# Patient Record
Sex: FEMALE | Race: WHITE | NOT HISPANIC OR LATINO
[De-identification: names, ages, dates, MRNs, and addresses within clinical notes are randomized per-mention and may not be internally consistent; named-entity substitution may affect disease eponyms.]

---

## 2019-10-16 ENCOUNTER — TRANSCRIPTION ENCOUNTER (OUTPATIENT)
Age: 39
End: 2019-10-16

## 2020-02-01 ENCOUNTER — OUTPATIENT (OUTPATIENT)
Dept: OUTPATIENT SERVICES | Facility: HOSPITAL | Age: 40
LOS: 1 days | Discharge: HOME | End: 2020-02-01
Payer: COMMERCIAL

## 2020-02-01 DIAGNOSIS — N63.20 UNSPECIFIED LUMP IN THE LEFT BREAST, UNSPECIFIED QUADRANT: ICD-10-CM

## 2020-02-01 PROCEDURE — G0279: CPT | Mod: 26

## 2020-02-01 PROCEDURE — 77066 DX MAMMO INCL CAD BI: CPT | Mod: 26

## 2020-02-01 PROCEDURE — 76642 ULTRASOUND BREAST LIMITED: CPT | Mod: 26,LT

## 2020-03-23 PROBLEM — Z00.00 ENCOUNTER FOR PREVENTIVE HEALTH EXAMINATION: Status: ACTIVE | Noted: 2020-03-23

## 2020-08-26 ENCOUNTER — APPOINTMENT (OUTPATIENT)
Dept: BREAST CENTER | Facility: CLINIC | Age: 40
End: 2020-08-26
Payer: COMMERCIAL

## 2020-08-26 VITALS
HEIGHT: 64 IN | SYSTOLIC BLOOD PRESSURE: 132 MMHG | DIASTOLIC BLOOD PRESSURE: 80 MMHG | BODY MASS INDEX: 29.88 KG/M2 | WEIGHT: 175 LBS | TEMPERATURE: 98 F

## 2020-08-26 DIAGNOSIS — N63.20 UNSPECIFIED LUMP IN THE LEFT BREAST, UNSPECIFIED QUADRANT: ICD-10-CM

## 2020-08-26 DIAGNOSIS — Z86.79 PERSONAL HISTORY OF OTHER DISEASES OF THE CIRCULATORY SYSTEM: ICD-10-CM

## 2020-08-26 DIAGNOSIS — Z86.39 PERSONAL HISTORY OF OTHER ENDOCRINE, NUTRITIONAL AND METABOLIC DISEASE: ICD-10-CM

## 2020-08-26 DIAGNOSIS — R92.2 INCONCLUSIVE MAMMOGRAM: ICD-10-CM

## 2020-08-26 DIAGNOSIS — Z87.2 PERSONAL HISTORY OF DISEASES OF THE SKIN AND SUBCUTANEOUS TISSUE: ICD-10-CM

## 2020-08-26 PROCEDURE — 99203 OFFICE O/P NEW LOW 30 MIN: CPT

## 2020-08-26 NOTE — REVIEW OF SYSTEMS
[Fever] : no fever [Abn Vaginal Bleeding] : no unexplained vaginal bleeding [Chills] : no chills [As Noted in HPI] : as noted in HPI [Skin Wound] : skin wound [Breast Pain] : no breast pain [Hot Flashes] : no hot flashes [Breast Lump] : no breast lump [Negative] : Heme/Lymph

## 2020-08-26 NOTE — PAST MEDICAL HISTORY
[Menstruating] : The patient is menstruating [Menarche Age ____] : age at menarche was [unfilled] [Definite ___ (Date)] : the last menstrual period was [unfilled] [History of Hormone Replacement Treatment] : has no history of hormone replacement treatment [Total Preg ___] : G[unfilled] [Normal Amount/Duration] : it was of a normal amount and duration [Regular Cycle Intervals] : have been regular [Live Births ___] : P[unfilled]  [FreeTextEntry5] : denies  [Age At Live Birth ___] : Age at live birth: [unfilled] [FreeTextEntry7] : denies [FreeTextEntry6] : denies [FreeTextEntry8] : yes currently x 2 years

## 2020-08-26 NOTE — DATA REVIEWED
[FreeTextEntry1] : EXAM: MG MAMMO DIAG W MARÍA ELENA BI#\par EXAM: US BREAST LIMITED LT\par \par \par PROCEDURE DATE: 02/01/2020\par \par \par \par INTERPRETATION: Clinical History / Reason for exam: Area of palpable\par concern in the lateral left breast. The patient is currently breast-feeding.\par \par The patient reports her last clinical breast examination was performed 1\par month ago.\par \par Family history: None\par \par Comparisons: None.\par \par Views obtained:Full field CC and MLO views of bilateral breasts with\par tomosynthesis. Spot compression views of the left breast..\par \par Computer-aided detection was utilized in the interpretation of this\par examination.\par \par Breast composition:The breasts are heterogeneously dense, which may obscure\par small masses.\par \par Findings:\par \par Mammogram:\par \par Triangular marker denotes the site of palpable concern in the left breast\par upper outer quadrant. No suspicious mass, microcalcifications or areas of\par architectural distortion is seen either breast.\par \par Ultrasound:\par \par Targeted unilateral left breast ultrasound was performed.\par \par No suspicious solid or cystic masses.\par \par Impression: No mammographic or sonographic evidence of malignancy. No\par mammographic or sonographic correlate to the area palpable concern in the\par lateral left breast.\par \par Recommendation: Any decision to biopsy the palpable abnormality should be\par based on the level of clinical concern.\par \par BI-RADS Category 1: Negative\par \par \par The above findings and recommendations were discussed with the patient at\par the time of the examination.\par \par \par \par \par \par \par GEOFF SWANSON M.D., ATTENDING RADIOLOGIST\par This document has been electronically signed. Feb 1 2020 3:00PM\par

## 2020-08-26 NOTE — ASSESSMENT
[FreeTextEntry1] : Gwendolyn is a 40 F with dense breast tissue, hidradenitis, and a gyn palpated left breast mass. \par \par On exam, I was not able to palpate any suspicious abnormalities within either breast.  Her most recent imaging was a b/l dx mammogram and L breast US On 2/1/2020 which was unrvealing for any suspicious abnormalities, deemed BIRADS 1. \par \par I will have her scheduled for a repeat b/l screening mammogram and US on 2/1/2021.  I will have her follow up after for a CBE. \par \par We discussed dense breasts.  Increasing breast density has been found to increase ones risk of breast cancer, but at this time, there is no clear indication for additional imaging in this setting, as both US and MRI have not been found to improve survival.  One can consider bilateral screening US.  However, out of 1000 women screened, the use of routine US will only identify an additional 3-5 cancers.  The use of US was found to increase the likelihood of undergoing more imaging and more biopsies.  She does have dense breasts.  We have decided to proceed with screening bilateral breast US at this time.  This will be scheduled with her next screening mammogram.\par \par She is otherwise at an average risk for breast cancer and should start annual screening mammograms at the age of 41. \par \par In regards to her hidradenitis, I have referred her to a plastic surgeon for possible surgical treatment. \par \par All of her questions were answered.  She knows to call with any further questions or concerns. \par \par PLAN: \par -b/l screening mammogram and US 2/1/2021\par -f/up after

## 2020-08-26 NOTE — PHYSICAL EXAM
[Normocephalic] : normocephalic [Atraumatic] : atraumatic [EOMI] : extra ocular movement intact [No Supraclavicular Adenopathy] : no supraclavicular adenopathy [Examined in the supine and seated position] : examined in the supine and seated position [No Cervical Adenopathy] : no cervical adenopathy [No dominant masses] : no dominant masses left breast [No dominant masses] : no dominant masses in right breast  [No Nipple Retraction] : no right nipple retraction [No Axillary Lymphadenopathy] : no left axillary lymphadenopathy [Soft] : abdomen soft [Not Tender] : non-tender [No Edema] : no edema [No Rashes] : no rashes [No Ulceration] : no ulceration [de-identified] : no suspicious masses palpated within either breast  [de-identified] : in area of concern @3N8, no suspicious abnormalities were palpated  [de-identified] : several open wounds from her hsitory of hidradenitis, no surrounding erythema or induration currently

## 2020-09-28 ENCOUNTER — TRANSCRIPTION ENCOUNTER (OUTPATIENT)
Age: 40
End: 2020-09-28

## 2021-04-29 ENCOUNTER — OUTPATIENT (OUTPATIENT)
Dept: OUTPATIENT SERVICES | Facility: HOSPITAL | Age: 41
LOS: 1 days | Discharge: HOME | End: 2021-04-29

## 2021-04-29 DIAGNOSIS — N97.9 FEMALE INFERTILITY, UNSPECIFIED: ICD-10-CM

## 2021-05-03 ENCOUNTER — EMERGENCY (EMERGENCY)
Facility: HOSPITAL | Age: 41
LOS: 0 days | Discharge: HOME | End: 2021-05-03
Attending: EMERGENCY MEDICINE | Admitting: EMERGENCY MEDICINE
Payer: COMMERCIAL

## 2021-05-03 VITALS
TEMPERATURE: 97 F | DIASTOLIC BLOOD PRESSURE: 82 MMHG | WEIGHT: 186.07 LBS | HEIGHT: 64 IN | OXYGEN SATURATION: 97 % | HEART RATE: 80 BPM | SYSTOLIC BLOOD PRESSURE: 134 MMHG | RESPIRATION RATE: 20 BRPM
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
## 2021-05-03 DIAGNOSIS — M54.2 CERVICALGIA: ICD-10-CM
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
## 2021-05-03 PROCEDURE — 99053 MED SERV 10PM-8AM 24 HR FAC: CPT

## 2021-05-03 PROCEDURE — 99283 EMERGENCY DEPT VISIT LOW MDM: CPT

## 2021-05-03 NOTE — ED PROVIDER NOTE - PHYSICAL EXAMINATION
Physical Exam    Vital Signs: I have reviewed the initial vital signs.  Constitutional: well-nourished, appears stated age, no acute distress  Eyes: Conjunctiva pink, Sclera clear, PERRLA, EOMI.  Cardiovascular: S1 and S2, regular rate, regular rhythm, well-perfused extremities, radial pulses equal and 2+  Respiratory: unlabored respiratory effort, clear to auscultation bilaterally no wheezing, rales and rhonchi  Gastrointestinal: soft, non-tender abdomen, no pulsatile mass, normal bowl sounds  Musculoskeletal: supple neck, no lower extremity edema, no midline tenderness. TTP of the L cervical paraspinal region, no bruising, swelling or crepitus noted.   Integumentary: warm, dry, no rash  Neurologic: awake, alert, cranial nerves II-XII grossly intact, extremities’ motor and sensory functions grossly intact  Psychiatric: appropriate mood, appropriate affect

## 2021-05-03 NOTE — ED PROVIDER NOTE - PATIENT PORTAL LINK FT
You can access the FollowMyHealth Patient Portal offered by [facility] by registering at the following website: [email or web address]. By joining [employer]’s FollowMyHealth portal, you will also be able to view your health information using other applications (apps) compatible with our system.
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
## 2021-06-25 ENCOUNTER — EMERGENCY (EMERGENCY)
Facility: HOSPITAL | Age: 41
LOS: 0 days | Discharge: HOME | End: 2021-06-25
Attending: STUDENT IN AN ORGANIZED HEALTH CARE EDUCATION/TRAINING PROGRAM | Admitting: STUDENT IN AN ORGANIZED HEALTH CARE EDUCATION/TRAINING PROGRAM
Payer: COMMERCIAL

## 2021-06-25 VITALS
DIASTOLIC BLOOD PRESSURE: 98 MMHG | HEIGHT: 64 IN | SYSTOLIC BLOOD PRESSURE: 152 MMHG | OXYGEN SATURATION: 99 % | RESPIRATION RATE: 18 BRPM | TEMPERATURE: 98 F | WEIGHT: 160.06 LBS | HEART RATE: 89 BPM

## 2021-06-25 DIAGNOSIS — Z3A.01 LESS THAN 8 WEEKS GESTATION OF PREGNANCY: ICD-10-CM

## 2021-06-25 DIAGNOSIS — N93.9 ABNORMAL UTERINE AND VAGINAL BLEEDING, UNSPECIFIED: ICD-10-CM

## 2021-06-25 DIAGNOSIS — O09.521 SUPERVISION OF ELDERLY MULTIGRAVIDA, FIRST TRIMESTER: ICD-10-CM

## 2021-06-25 DIAGNOSIS — O99.611 DISEASES OF THE DIGESTIVE SYSTEM COMPLICATING PREGNANCY, FIRST TRIMESTER: ICD-10-CM

## 2021-06-25 DIAGNOSIS — O20.9 HEMORRHAGE IN EARLY PREGNANCY, UNSPECIFIED: ICD-10-CM

## 2021-06-25 DIAGNOSIS — K21.9 GASTRO-ESOPHAGEAL REFLUX DISEASE WITHOUT ESOPHAGITIS: ICD-10-CM

## 2021-06-25 PROBLEM — Z78.9 OTHER SPECIFIED HEALTH STATUS: Chronic | Status: ACTIVE | Noted: 2021-05-03

## 2021-06-25 LAB
ABO RH CONFIRMATION: SIGNIFICANT CHANGE UP
ALBUMIN SERPL ELPH-MCNC: 4.4 G/DL — SIGNIFICANT CHANGE UP (ref 3.5–5.2)
ALP SERPL-CCNC: 74 U/L — SIGNIFICANT CHANGE UP (ref 30–115)
ALT FLD-CCNC: 13 U/L — SIGNIFICANT CHANGE UP (ref 0–41)
ANION GAP SERPL CALC-SCNC: 8 MMOL/L — SIGNIFICANT CHANGE UP (ref 7–14)
APPEARANCE UR: ABNORMAL
APTT BLD: 32.5 SEC — SIGNIFICANT CHANGE UP (ref 27–39.2)
AST SERPL-CCNC: 16 U/L — SIGNIFICANT CHANGE UP (ref 0–41)
BACTERIA # UR AUTO: NEGATIVE — SIGNIFICANT CHANGE UP
BASOPHILS # BLD AUTO: 0.02 K/UL — SIGNIFICANT CHANGE UP (ref 0–0.2)
BASOPHILS NFR BLD AUTO: 0.2 % — SIGNIFICANT CHANGE UP (ref 0–1)
BILIRUB SERPL-MCNC: <0.2 MG/DL — SIGNIFICANT CHANGE UP (ref 0.2–1.2)
BILIRUB UR-MCNC: NEGATIVE — SIGNIFICANT CHANGE UP
BLD GP AB SCN SERPL QL: SIGNIFICANT CHANGE UP
BUN SERPL-MCNC: 9 MG/DL — LOW (ref 10–20)
CALCIUM SERPL-MCNC: 8.9 MG/DL — SIGNIFICANT CHANGE UP (ref 8.5–10.1)
CHLORIDE SERPL-SCNC: 105 MMOL/L — SIGNIFICANT CHANGE UP (ref 98–110)
CO2 SERPL-SCNC: 24 MMOL/L — SIGNIFICANT CHANGE UP (ref 17–32)
COLOR SPEC: ABNORMAL
CREAT SERPL-MCNC: 0.8 MG/DL — SIGNIFICANT CHANGE UP (ref 0.7–1.5)
DIFF PNL FLD: ABNORMAL
EOSINOPHIL # BLD AUTO: 0.03 K/UL — SIGNIFICANT CHANGE UP (ref 0–0.7)
EOSINOPHIL NFR BLD AUTO: 0.3 % — SIGNIFICANT CHANGE UP (ref 0–8)
EPI CELLS # UR: 4 /HPF — SIGNIFICANT CHANGE UP (ref 0–5)
GLUCOSE SERPL-MCNC: 92 MG/DL — SIGNIFICANT CHANGE UP (ref 70–99)
GLUCOSE UR QL: NEGATIVE — SIGNIFICANT CHANGE UP
HCG SERPL-ACNC: 13.6 MIU/ML — HIGH
HCT VFR BLD CALC: 38.3 % — SIGNIFICANT CHANGE UP (ref 37–47)
HGB BLD-MCNC: 12.8 G/DL — SIGNIFICANT CHANGE UP (ref 12–16)
HYALINE CASTS # UR AUTO: 4 /LPF — SIGNIFICANT CHANGE UP (ref 0–7)
IMM GRANULOCYTES NFR BLD AUTO: 0.3 % — SIGNIFICANT CHANGE UP (ref 0.1–0.3)
INR BLD: 1.06 RATIO — SIGNIFICANT CHANGE UP (ref 0.65–1.3)
KETONES UR-MCNC: NEGATIVE — SIGNIFICANT CHANGE UP
LEUKOCYTE ESTERASE UR-ACNC: ABNORMAL
LYMPHOCYTES # BLD AUTO: 1.23 K/UL — SIGNIFICANT CHANGE UP (ref 1.2–3.4)
LYMPHOCYTES # BLD AUTO: 12.6 % — LOW (ref 20.5–51.1)
MCHC RBC-ENTMCNC: 29.6 PG — SIGNIFICANT CHANGE UP (ref 27–31)
MCHC RBC-ENTMCNC: 33.4 G/DL — SIGNIFICANT CHANGE UP (ref 32–37)
MCV RBC AUTO: 88.5 FL — SIGNIFICANT CHANGE UP (ref 81–99)
MONOCYTES # BLD AUTO: 0.43 K/UL — SIGNIFICANT CHANGE UP (ref 0.1–0.6)
MONOCYTES NFR BLD AUTO: 4.4 % — SIGNIFICANT CHANGE UP (ref 1.7–9.3)
NEUTROPHILS # BLD AUTO: 8 K/UL — HIGH (ref 1.4–6.5)
NEUTROPHILS NFR BLD AUTO: 82.2 % — HIGH (ref 42.2–75.2)
NITRITE UR-MCNC: NEGATIVE — SIGNIFICANT CHANGE UP
NRBC # BLD: 0 /100 WBCS — SIGNIFICANT CHANGE UP (ref 0–0)
PH UR: 6.5 — SIGNIFICANT CHANGE UP (ref 5–8)
PLATELET # BLD AUTO: 255 K/UL — SIGNIFICANT CHANGE UP (ref 130–400)
POTASSIUM SERPL-MCNC: 4.3 MMOL/L — SIGNIFICANT CHANGE UP (ref 3.5–5)
POTASSIUM SERPL-SCNC: 4.3 MMOL/L — SIGNIFICANT CHANGE UP (ref 3.5–5)
PROT SERPL-MCNC: 6.9 G/DL — SIGNIFICANT CHANGE UP (ref 6–8)
PROT UR-MCNC: ABNORMAL
PROTHROM AB SERPL-ACNC: 12.2 SEC — SIGNIFICANT CHANGE UP (ref 9.95–12.87)
RBC # BLD: 4.33 M/UL — SIGNIFICANT CHANGE UP (ref 4.2–5.4)
RBC # FLD: 12.9 % — SIGNIFICANT CHANGE UP (ref 11.5–14.5)
RBC CASTS # UR COMP ASSIST: 194 /HPF — HIGH (ref 0–4)
SODIUM SERPL-SCNC: 137 MMOL/L — SIGNIFICANT CHANGE UP (ref 135–146)
SP GR SPEC: 1.01 — LOW (ref 1.01–1.03)
UROBILINOGEN FLD QL: SIGNIFICANT CHANGE UP
WBC # BLD: 9.74 K/UL — SIGNIFICANT CHANGE UP (ref 4.8–10.8)
WBC # FLD AUTO: 9.74 K/UL — SIGNIFICANT CHANGE UP (ref 4.8–10.8)
WBC UR QL: 13 /HPF — HIGH (ref 0–5)

## 2021-06-25 PROCEDURE — 99283 EMERGENCY DEPT VISIT LOW MDM: CPT

## 2021-06-25 PROCEDURE — 76830 TRANSVAGINAL US NON-OB: CPT | Mod: 26

## 2021-06-25 PROCEDURE — 99285 EMERGENCY DEPT VISIT HI MDM: CPT

## 2021-06-25 RX ORDER — CEFTRIAXONE 500 MG/1
1000 INJECTION, POWDER, FOR SOLUTION INTRAMUSCULAR; INTRAVENOUS ONCE
Refills: 0 | Status: COMPLETED | OUTPATIENT
Start: 2021-06-25 | End: 2021-06-25

## 2021-06-25 RX ORDER — CEFPODOXIME PROXETIL 100 MG
1 TABLET ORAL
Qty: 20 | Refills: 0
Start: 2021-06-25 | End: 2021-07-04

## 2021-06-25 RX ORDER — SODIUM CHLORIDE 9 MG/ML
1000 INJECTION INTRAMUSCULAR; INTRAVENOUS; SUBCUTANEOUS ONCE
Refills: 0 | Status: COMPLETED | OUTPATIENT
Start: 2021-06-25 | End: 2021-06-25

## 2021-06-25 RX ADMIN — SODIUM CHLORIDE 1000 MILLILITER(S): 9 INJECTION INTRAMUSCULAR; INTRAVENOUS; SUBCUTANEOUS at 15:12

## 2021-06-25 RX ADMIN — CEFTRIAXONE 100 MILLIGRAM(S): 500 INJECTION, POWDER, FOR SOLUTION INTRAMUSCULAR; INTRAVENOUS at 17:18

## 2021-06-25 NOTE — CONSULT NOTE ADULT - ASSESSMENT
42yo  @5w2d by LMP (21) with positive urine pregnancy test at home on 21, with pregnancy of unknown location, likely complete SAB vs early IUP vs ectopic. Clinically and hemodynamically stable.    - no acute GYN intervention at this time  - repeat urine pregnancy test in 1 week  - bleeding and pain precautions  - tylenol prn pain  - Rhogam needed before discharge  - Discussed with  and   - Dispo per ED  40yo  @5w2d by LMP (21) with positive urine pregnancy test at home on 21, likely complete SAB, can't r/o early IUP vs ectopic. Clinically and hemodynamically stable.    - no acute GYN intervention at this time  - repeat urine pregnancy test in 1 week  - bleeding and pain precautions  - tylenol prn pain  - Rhogam needed before discharge  - Discussed with  and   - Dispo per ED

## 2021-06-25 NOTE — ED PROVIDER NOTE - PHYSICAL EXAMINATION
GENERAL: Well-nourished, Well-developed. NAD.  HEAD: NCAT  Eyes: PERRLA, EOMI. No asymmetry. No nystagmus. No conjunctival injection. Non-icteric sclera.  ENMT: MMM.   Neck: Supple. FROM  CVS: RRR. Normal S1,S2. No murmurs appreciated on auscultation   RESP: No use of accessory muscles. Chest rise symmetrical with good expansion. Lungs clear to auscultation B/L. No wheezing, rales, or rhonchi auscultated.  GI: Normal auscultation of bowel sounds in all 4 quadrants. Soft, Nontender, Nondistended. No guarding or rebound tenderness. No CVAT B/L.  Skin: Warm, Dry. No rashes or lesions. Good cap refill < 2 sec B/L.  EXT: Radial and pedal pulses present B/L. No calf tenderness or swelling B/L. No palpable cords. No pedal edema B/L.  Neuro: AA&O x 3. Sensation grossly intact. Strength 5/5 B/L. Gait within normal limits.   GYN: External Genitalia unremarkable. (+)moderate amount of blood in vaginal vault. os closed. no cmt or adnexal ttp. Chaperoned by: Dr. Armas

## 2021-06-25 NOTE — ED PROVIDER NOTE - NS ED ROS FT
Constitutional: (-) fever (-) malaise (-) diaphoresis (-) chills  Eyes: (-) visual changes   ENMT: (-) nasal or chest congestion (-) runny nose (-) sore throat (-) hoarseness  (-) hearing changes (-) ear pain (-) ear discharge or infections (-) neck pain (-) neck stiffness  Cardiac: (-) chest pain  (-) palpitations (-) syncope (-) edema  Respiratory: (-) cough (-) SOB (-) [place]  GI: (-) nausea (-) vomiting (-) diarrhea (-) abdominal pain   : (-) dysuria (-) increased frequency  (-) hematuria (-) incontinence  MS: (-) back pain (-) myalgia (-) muscle weakness (-)  joint pain  Neuro: (-) headache (-) dizziness (-) numbness/tingling to extremities B/L (-) weakness   Skin: (-) rash (-) laceration  GYN: (+) pelvic pain (+) abnormal bleeding (-) abnormal discharge or odor   Except as documented in the HPI, all other systems are negative.

## 2021-06-25 NOTE — CONSULT NOTE ADULT - SUBJECTIVE AND OBJECTIVE BOX
OBYGN PGY2    Chief Complaint: vaginal bleeding, abdominal cramping    HPI:   42yo  at 5w2d by LMP (21), with positive urine pregnancy test on 21,     Location -  Severity -  Quality -  Duration -  Timing -   Modifying Factors -   Associated Signs/Symptoms -     Ob/Gyn History:  G P                 LMP -                   Cycle Length -   Denies history of ovarian cysts, uterine fibroids, abnormal paps, or STIs  Last Pap Smear -   Last Mammogram -   Last Colonoscopy -        Denies the following: constitutional symptoms, visual symptoms, cardiovascular symptoms, respiratory symptoms, GI symptoms, musculoskeletal symptoms, skin symptoms, neurologic symptoms, hematologic symptoms, allergic symptoms, psychiatric symptoms  Except any pertinent positives listed.     Home Medications:      Allergies    No Known Allergies    Intolerances        PAST MEDICAL & SURGICAL HISTORY:  No pertinent past medical history    No significant past surgical history        FAMILY HISTORY:      SOCIAL HISTORY: Denies cigarette use, alcohol use, or illicit drug use    Vital Signs Last 24 Hrs  T(F): 98.5 (2021 12:49), Max: 98.5 (2021 12:49)  HR: 89 (2021 12:49) (89 - 89)  BP: 152/98 (2021 12:49) (152/98 - 152/98)  RR: 18 (2021 12:49) (18 - 18)    General Appearance - AAOx3, NAD  Heart - S1S2 regular rate and rhythm  Lung - CTA Bilaterally  Abdomen - Soft, nontender, nondistended, no rebound, no rigidity, no guarding, bowel sounds present    GYN/Pelvis:  External genitalia:  Vagina:  Cervix:  Uterus:  Adnexa:    LABS:                        12.8   9.74  )-----------( 255      ( 2021 15:28 )             38.3     HCG Quantitative, Serum: 13.6 mIU/mL (21 @ 15:28)    ABO RH Interpretation: B NEG (21 @ 15:28)  Antibody Screen: NEG (21 @ 15:28)        137  |  105  |  9<L>  ----------------------------<  92  4.3   |  24  |  0.8    Ca    8.9      2021 15:28    TPro  6.9  /  Alb  4.4  /  TBili  <0.2  /  DBili  x   /  AST  16  /  ALT  13  /  AlkPhos  74  06-25    PT/INR - ( 2021 15:28 )   PT: 12.20 sec;   INR: 1.06 ratio         PTT - ( 2021 15:28 )  PTT:32.5 sec  Urinalysis Basic - ( 2021 15:28 )    Color: Brown / Appearance: Slightly Turbid / S.006 / pH: x  Gluc: x / Ketone: Negative  / Bili: Negative / Urobili: <2 mg/dL   Blood: x / Protein: 100 mg/dL / Nitrite: Negative   Leuk Esterase: Moderate / RBC: 194 /HPF / WBC 13 /HPF   Sq Epi: x / Non Sq Epi: 4 /HPF / Bacteria: Negative          RADIOLOGY & ADDITIONAL STUDIES:   OBYGN PGY2    Chief Complaint: vaginal bleeding, abdominal cramping    HPI:   42yo  at 5w2d by LMP (21), with positive urine pregnancy test on 21, presented to ED for vaginal bleeding and lower abdominal cramping. She had one episode of heavy vaginal bleeding, described as a "puddle of blood" in the bathroom and has used 3 pads since then (not soaked) now with light spotting without passage of clots. Denies dizziness, lightheadedness, SOB, or palpitations.  Cramping is similar to period-like cramps, 6/10 in intensity, waxing and waning. This is a planned and desired pregnancy. She has not seen an OBGYN for this pregnancy yet. She had issues with fertilty and saw  and had a saline hysterogram which showed one blocked tube, per patient. She has known history of uterine fibroids and was told the location of fibroid increased her chances of SAB.     Denies the following: constitutional symptoms, visual symptoms, cardiovascular symptoms, respiratory symptoms, GI symptoms, musculoskeletal symptoms, skin symptoms, neurologic symptoms, hematologic symptoms, allergic symptoms, psychiatric symptoms  Except any pertinent positives listed.     Ob/Gyn History:                   LMP -  21                  Cycle Length - regular, q28d  Has history of fibroids. History of unilateral blocked fallopian tube based on hysterosalpingogram per patient.   Denies history of ovarian cysts, abnormal paps, or STIs    Home Medications:  none    Allergies:  No Known Allergies        PAST MEDICAL & SURGICAL HISTORY:  GERD  Hidradenitis Suppurativa     No significant past surgical history      FAMILY HISTORY:  HTN, HLD    SOCIAL HISTORY: Denies cigarette use, alcohol use, or illicit drug use    Vital Signs Last 24 Hrs  T(F): 98.5 (2021 12:49), Max: 98.5 (2021 12:49)  HR: 89 (2021 12:49) (89 - 89)  BP: 152/98 (2021 12:49) (152/98 - 152/98)  RR: 18 (2021 12:49) (18 - 18)    General Appearance - AAOx3, NAD  Heart - S1S2 regular rate and rhythm  Lung - CTA Bilaterally  Abdomen - Soft, nontender, nondistended, no rebound, no rigidity, no guarding, bowel sounds present    GYN/Pelvis:  External genitalia: red papules along bilateral groins consistent with h/o of hidradenitis suppurativa   Vagina: 5cc blood clot  Cervix: closed, no active bleeding, negative CMT  Uterus: approximately 6wk size, no fundal tenderness  Adnexa: no tenderness or masses palpated bilaterally     LABS:                        12.8   9.74  )-----------( 255      ( 2021 15:28 )             38.3     HCG Quantitative, Serum: 13.6 mIU/mL (21 @ 15:28)    ABO RH Interpretation: B NEG (21 @ 15:28)  Antibody Screen: NEG (21 @ 15:28)        137  |  105  |  9<L>  ----------------------------<  92  4.3   |  24  |  0.8    Ca    8.9      2021 15:28    TPro  6.9  /  Alb  4.4  /  TBili  <0.2  /  DBili  x   /  AST  16  /  ALT  13  /  AlkPhos  74      PT/INR - ( 2021 15:28 )   PT: 12.20 sec;   INR: 1.06 ratio         PTT - ( 2021 15:28 )  PTT:32.5 sec  Urinalysis Basic - ( 2021 15:28 )    Color: Brown / Appearance: Slightly Turbid / S.006 / pH: x  Gluc: x / Ketone: Negative  / Bili: Negative / Urobili: <2 mg/dL   Blood: x / Protein: 100 mg/dL / Nitrite: Negative   Leuk Esterase: Moderate / RBC: 194 /HPF / WBC 13 /HPF   Sq Epi: x / Non Sq Epi: 4 /HPF / Bacteria: Negative          RADIOLOGY & ADDITIONAL STUDIES:  < from: US Transvaginal (21 @ 16:43) >    EXAM:  US TRANSVAGINAL            PROCEDURE DATE:  2021            INTERPRETATION:  CLINICAL INFORMATION: Pregnant. Vaginal bleeding.    LMP: 2021    COMPARISON: None available.    TECHNIQUE:  Endovaginal and transabdominal pelvic sonogram. Color and Spectral Doppler was performed.    FINDINGS:    Uterus: 9.0 cm x 4.2 cm x 5.4 cm.    No intrauterine gestational sac identified. Endometrium measures 1.0 cm in thickness. Multiple fibroids reported, largest is measured at 1.6 x 1.4 x 1.2 cm and is intramural.    Right ovary: 1.7 cm x 1.4 cm . Increased echogenicity noted within the ovary, possibly representing underlying calcified lesion. Dermoid not excluded. Vascular flow demonstrated to the ovary.  Left ovary: 1.9 cm x 1.8 cm x 1.8 cm. Within normal limits. Doppler flow demonstrated.    Fluid: None.    IMPRESSION:    1.  No intrauterine gestation is seen. These findings represent a pregnancy of unknown location. The sonographic differential diagnosis includes: an intrauterinegestation too early to visualize, a spontaneous , or an occult ectopic pregnancy. Continued close clinical follow-up, serial serum beta-HCG levels and short term sonographic follow up is recommended  2.  Increased echogenicity noted within the right ovary, possibly representing underlying calcified lesion. Dermoid not excluded. Attention on follow-up imaging.  3.  Uterine fibroids.              KEKE RAMIREZ MD; Attending Radiologist  This document has been electronically signed. 2021  4:57PM    < end of copied text >

## 2021-06-25 NOTE — ED PROVIDER NOTE - PATIENT PORTAL LINK FT
You can access the FollowMyHealth Patient Portal offered by [facility] by registering at the following website: [email or web address]. By joining [employer]’s FollowMyHealth portal, you will also be able to view your health information using other applications (apps) compatible with our system.

## 2021-06-25 NOTE — ED ADULT TRIAGE NOTE - WEIGHT IN LBS
Advise him to increase his lantus by 3 units (13 units daily) and call us in 3 days with glucose readings.   160

## 2021-06-25 NOTE — ED PROVIDER NOTE - NSFOLLOWUPINSTRUCTIONS_ED_ALL_ED_FT
**Follow up with OBGYN Dr. Osuna in 1 week**  Take antibiotic as prescribed**    Vaginal Bleeding During Pregnancy, First Trimester      A small amount of bleeding (spotting) from the vagina is common during early pregnancy. Sometimes the bleeding is normal and does not cause problems. At other times, though, bleeding may be a sign of something serious. Tell your doctor about any bleeding from your vagina right away.      Follow these instructions at home:    Activity     Follow your doctor's instructions about how active you can be.  If needed, make plans for someone to help with your normal activities.  Do not have sex or orgasms until your doctor says that this is safe.      General instructions     Take over-the-counter and prescription medicines only as told by your doctor.  Watch your condition for any changes.    Write down:    The number of pads you use each day.  How often you change pads.  How soaked (saturated) your pads are.  Do not use tampons.  Do not douche.  If you pass any tissue from your vagina, save it to show to your doctor.  Keep all follow-up visits as told by your doctor. This is important.    Contact a doctor if:    You have vaginal bleeding at any time while you are pregnant.  You have cramps.  You have a fever.    Get help right away if:    You have very bad cramps in your back or belly (abdomen).  You pass large clots or a lot of tissue from your vagina.  Your bleeding gets worse.  You feel light-headed.  You feel weak.  You pass out (faint).  You have chills.  You are leaking fluid from your vagina.  You have a gush of fluid from your vagina.      Summary    Sometimes vaginal bleeding during pregnancy is normal and does not cause problems. At other times, bleeding may be a sign of something serious.  Tell your doctor about any bleeding from your vagina right away.  Follow your doctor's instructions about how active you can be. You may need someone to help you with your normal activities.  This information is not intended to replace advice given to you by your health care provider. Make sure you discuss any questions you have with your health care provider.

## 2021-06-25 NOTE — ED PROVIDER NOTE - ATTENDING CONTRIBUTION TO CARE
41 yr old f  who presents w/ vaginal bleeding. Pt states that she is ~ 5 weeks pregnancy (positive pregnancy test at home). Today she went to the bathroom and noticed vaginal bleeding. Pt states that she is having heavy bleeding and has been using pads. Pt also reports lower abd crampy like pain. Pt denies any urinary symptoms, vaginal discharge. Pt denies any nausea, vomiting, fevers, chills or any other complaints.     VITAL SIGNS: I have reviewed nursing notes and confirm.  CONSTITUTIONAL: non-toxic, well appearing  SKIN: no rash, no petechiae.  EYES: EOMI, pink conjunctiva, anicteric  ENT: tongue midline, no exudates, MMM  NECK: Supple; no meningismus, no JVD  CARD: RRR, no murmurs, equal radial pulses bilaterally 2+  RESP: CTAB, no respiratory distress  ABD: Soft, non-tender, non-distended, no peritoneal signs, no HSM, no CVA tenderness  : moderate vaginal bleeding, no cmt tenderness or adnexa tenderness (LIOR Gonzalez performed exam)   EXT: Normal ROM x4. No edema. No calves tenderness  NEURO: Alert, oriented. CN2-12 intact, equal strength bilaterally, nl gait.    a/p  41 yr old f that presents with vaginal bleeding  -labs  -RH status  -ua  -us  -dispo pending   PSYCH: Cooperative, appropriate. 41 yr old f  who presents w/ vaginal bleeding. Pt states that she is ~ 5 weeks pregnancy (positive pregnancy test at home). Today she went to the bathroom and noticed vaginal bleeding. Pt states that she is having heavy bleeding and has been using pads. Pt also reports lower abd crampy like pain. Pt denies any urinary symptoms, vaginal discharge. Pt denies any nausea, vomiting, fevers, chills or any other complaints.     VITAL SIGNS: I have reviewed nursing notes and confirm.  CONSTITUTIONAL: non-toxic, well appearing  SKIN: no rash, no petechiae.  EYES: EOMI, pink conjunctiva, anicteric  ENT: tongue midline, no exudates, MMM  NECK: Supple; no meningismus, no JVD  CARD: RRR, no murmurs, equal radial pulses bilaterally 2+  RESP: CTAB, no respiratory distress  ABD: Soft, non-tender, non-distended, no peritoneal signs, no HSM, no CVA tenderness  : moderate vaginal bleeding, no cmt tenderness or adnexa tenderness. os closed. (LIOR Gonzalez performed exam)   EXT: Normal ROM x4. No edema. No calves tenderness  NEURO: Alert, oriented. CN2-12 intact, equal strength bilaterally, nl gait.    a/p  41 yr old f that presents with vaginal bleeding  -labs  -RH status  -ua  -us  -dispo pending   PSYCH: Cooperative, appropriate.

## 2021-06-25 NOTE — ED PROVIDER NOTE - CARE PROVIDER_API CALL
Hugo Osuna)  Obstetrics and Gynecology  [address] [place] [address]  Phone: [phone]  Fax: [phone]  Follow Up Time: 4-6 Days

## 2021-06-25 NOTE — ED ADULT TRIAGE NOTE - CHIEF COMPLAINT QUOTE
Patient states she is 5 weeks pregnant and woke this morning with cramping and vaginal bleeding with LLQ pain

## 2021-06-25 NOTE — ED PROVIDER NOTE - OBJECTIVE STATEMENT
42 yo F , currently 5 weeks pregnant, no known pmhx presenting to the ED for evaluation of vaginal bleeding and intermittent lower abdominal cramping this morning. Pt reports her LMP 21 had a positive at home urine pregnancy test on 21. Pt reports when she used the bathroom today there was a puddle of blood, reports passage of some clots, 3 pads used. Denies any fever, chills, nausea, vomiting, dysuria, hematuria, chest pain, sob, dizziness, weakness.

## 2021-06-25 NOTE — ED PROVIDER NOTE - CLINICAL SUMMARY MEDICAL DECISION MAKING FREE TEXT BOX
41 yr old f that presents with vaginal bleeding. us, labs, ua obtained. pt evaluated and cleared by ob/gyn. pt given rhogham. pt informed of all findings. pt discharged with ob/gyn follow up and strict return precautions.

## 2021-06-26 LAB
CULTURE RESULTS: SIGNIFICANT CHANGE UP
SPECIMEN SOURCE: SIGNIFICANT CHANGE UP

## 2021-07-02 NOTE — CHART NOTE - NSCHARTNOTEFT_GEN_A_CORE
PGY2 Note    40yo  at 5w2d by LMP (21), with positive urine pregnancy test on 21, presented to ED on 21 for vaginal bleeding and lower abdominal cramping. She had one episode of heavy vaginal bleeding, described as a "puddle of blood" in the bathroom and has used 3 pads since then (not soaked) now with light spotting without passage of clots. Denies dizziness, lightheadedness, SOB, or palpitations.  Cramping is similar to period-like cramps, 6/10 in intensity, waxing and waning. This is a planned and desired pregnancy. She has not seen an OBGYN for this pregnancy yet. She had issues with fertilty and saw  and had a saline hysterogram which showed one blocked tube, per patient. She has known history of uterine fibroids and was told the location of fibroid increased her chances of SAB.     Labs:  : 7.81>13.7/40.7<295, 136/4.4/102/28/9/0.7<83, bhcg 17.8, UA 30 prot, O pos    TVUS:  : The uterus measures 6.2 cm x 3.6 cm x 4.4 cm and is homogeneous with no mass seen. The endometrium measures 2 mm in thickness and there is a 4 mm cystic structure within the uterine cavity. No fetal pole or yolk sac is seen. The possibility this represents an early gestational sac is a consideration. Correlation with hCG levels and follow-up study suggested.The right ovary measures 2.4 cm x 1.3 cm x 1.3 cm with no mass seen. The left ovary measures 3.0 cm x 1.6 cm x 2.3 cm with no mass seen.There is no free fluid in the pelvis.Impression:The endometrium measures 2 mm in thickness and there is a 4 mm cystic structure within the uterine cavity. No fetal pole or yolk sac is seen. The possibility this represents an early gestational sac is a consideration. Correlation with hCG levels and follow-up study suggested.    Differential diagnosis: likely complete SAB vs. early IUP vs. ectopic     rhogam given  : upreg negx2, PMD aware     Assessment:  Patient now with confirmed SAB, doing well. Dictated off GYN's b-hcg list.

## 2021-07-12 ENCOUNTER — TRANSCRIPTION ENCOUNTER (OUTPATIENT)
Age: 41
End: 2021-07-12

## 2022-04-15 ENCOUNTER — EMERGENCY (EMERGENCY)
Facility: HOSPITAL | Age: 42
LOS: 0 days | Discharge: HOME | End: 2022-04-15
Attending: STUDENT IN AN ORGANIZED HEALTH CARE EDUCATION/TRAINING PROGRAM | Admitting: STUDENT IN AN ORGANIZED HEALTH CARE EDUCATION/TRAINING PROGRAM
Payer: COMMERCIAL

## 2022-04-15 VITALS
SYSTOLIC BLOOD PRESSURE: 142 MMHG | HEART RATE: 97 BPM | DIASTOLIC BLOOD PRESSURE: 86 MMHG | TEMPERATURE: 98 F | HEIGHT: 64 IN | RESPIRATION RATE: 18 BRPM | OXYGEN SATURATION: 98 % | WEIGHT: 190.04 LBS

## 2022-04-15 VITALS
DIASTOLIC BLOOD PRESSURE: 60 MMHG | TEMPERATURE: 98 F | HEART RATE: 87 BPM | SYSTOLIC BLOOD PRESSURE: 123 MMHG | OXYGEN SATURATION: 97 % | RESPIRATION RATE: 18 BRPM

## 2022-04-15 DIAGNOSIS — L02.214 CUTANEOUS ABSCESS OF GROIN: ICD-10-CM

## 2022-04-15 DIAGNOSIS — Z88.8 ALLERGY STATUS TO OTHER DRUGS, MEDICAMENTS AND BIOLOGICAL SUBSTANCES STATUS: ICD-10-CM

## 2022-04-15 DIAGNOSIS — L73.2 HIDRADENITIS SUPPURATIVA: ICD-10-CM

## 2022-04-15 PROCEDURE — 10060 I&D ABSCESS SIMPLE/SINGLE: CPT

## 2022-04-15 PROCEDURE — 76536 US EXAM OF HEAD AND NECK: CPT | Mod: 26

## 2022-04-15 PROCEDURE — 99284 EMERGENCY DEPT VISIT MOD MDM: CPT | Mod: 25

## 2022-04-15 NOTE — ED PROVIDER NOTE - NSFOLLOWUPINSTRUCTIONS_ED_ALL_ED_FT
MAKE AN APPOINTMENT WITH YOUR PRIMARY DOCTOR. COVER THE AREA WITH GAUZE AND FOAM TAPE TWICE A DAY. TAKE YOUR ANTIBIOTIC AS DIRECTED.     Abscess    WHAT YOU NEED TO KNOW:    A warm compress may help your abscess drain. Your healthcare provider may make a cut in the abscess so it can drain. You may need surgery to remove an abscess that is on your hands or buttocks.     DISCHARGE INSTRUCTIONS:    Return to the emergency department if:     The area around your abscess becomes very painful, warm, or has red streaks.      You have a fever and chills.      Your heart is beating faster than usual.       You feel faint or confused.    Contact your healthcare provider if:     Your abscess gets bigger or does not get better.      Your abscess returns.      You have questions or concerns about your condition or care.    Medicines: You may need any of the following:     Antibiotics help treat a bacterial infection.       Acetaminophen decreases pain and fever. It is available without a doctor's order. Ask how much to take and how often to take it. Follow directions. Acetaminophen can cause liver damage if not taken correctly.      NSAIDs, such as ibuprofen, help decrease swelling, pain, and fever. This medicine is available with or without a doctor's order. NSAIDs can cause stomach bleeding or kidney problems in certain people. If you take blood thinner medicine, always ask your healthcare provider if NSAIDs are safe for you. Always read the medicine label and follow directions.      Take your medicine as directed. Contact your healthcare provider if you think your medicine is not helping or if you have side effects. Tell him or her if you are allergic to any medicine. Keep a list of the medicines, vitamins, and herbs you take. Include the amounts, and when and why you take them. Bring the list or the pill bottles to follow-up visits. Carry your medicine list with you in case of an emergency.    Self-care:     Apply a warm compress to your abscess. This will help it open and drain. Wet a washcloth in warm, but not hot, water. Apply the compress for 10 minutes. Repeat this 4 times each day. Do not press on an abscess or try to open it with a needle. You may push the bacteria deeper or into your blood.       Do not share your clothes, towels, or sheets with anyone. This can spread the infection to others.       Wash your hands often. This can help prevent the spread of germs. Use soap and water or an alcohol-based hand rub.     Care for your wound after it is drained:     Care for your wound as directed. If your healthcare provider says it is okay, carefully remove the bandage and gauze packing. You may need to soak the gauze to get it out of your wound. Clean your wound and the area around it as directed. Dry the area and put on new, clean bandages. Change your bandages when they get wet or dirty.       Ask your healthcare provider how to change the gauze in your wound. Keep track of how many pieces of gauze are placed inside the wound. Do not put too much packing in the wound. Do not pack the gauze too tightly in your wound.     Follow up with your healthcare provider in 1 to 3 days: You may need to have your packing removed or your bandage changed. Write down your questions so you remember to ask them during your visits.        © Copyright [employer] 2019 All illustrations and images included in CareNotes are the copyrighted property of [employer] or [employer].

## 2022-04-15 NOTE — ED PROVIDER NOTE - CLINICAL SUMMARY MEDICAL DECISION MAKING FREE TEXT BOX
.    41-year-old female past medical history hidradenitis suppurativa p/w small inguinal abscess and surrounding cellulitis x1 week.  No fever, trauma, or drainage.    ROS PE above.  Abscess confirmed with POCUS.  I&D performed.  Patient tolerated well.  No complications.  Pt stable for dc w/ rx for abx, pmd f/up, and care as discussed.  Pt understands plan and signs and symptoms for ED return. DC home.     .

## 2022-04-15 NOTE — ED PROCEDURE NOTE - NS ED ATTENDING STATEMENT MOD
Attending Only
This was a shared visit with the DAYANA. I reviewed and verified the documentation and independently performed the documented:

## 2022-04-15 NOTE — ED PROVIDER NOTE - PATIENT PORTAL LINK FT
You can access the FollowMyHealth Patient Portal offered by [facility] by registering at the following website: [email or web address]. By joining [employer]’s FollowMyHealth portal, you will also be able to view your health information using other applications (apps) compatible with our system.

## 2022-04-15 NOTE — ED PROVIDER NOTE - OBJECTIVE STATEMENT
41 y.o. F, pmh of HS with recurrent abscesses in axilla and groin here for 1 week of swelling pain and redness to R groin. + reported fluctuance, no puss drainage. No fever chills abdomainl pain dysuria or hematuria. No recent abx surgery for tonsils

## 2022-04-15 NOTE — ED PROVIDER NOTE - PHYSICAL EXAMINATION
Physical Exam    Vital Signs: I have reviewed the initial vital signs.  Constitutional: well-nourished, appears stated age, no acute distress  Eyes: Conjunctiva pink, Sclera clear,   Cardiovascular: S1 and S2, regular rate, regular rhythm, well-perfused extremities, radial pulses equal and 2+, calves nonttp, equal in size  Respiratory: unlabored respiratory effort, speaking in full sentences, handling oral secretions  Gastrointestinal: soft, non-tender abdomen, no pulsatile mass, normal bowl sounds  Integumentary: warm, dry, no rashes, lacerations, + erythema, swelling , fluctuance and induration to 4 cm area of right groin   Neurologic: awake, alert, cranial nerves II-XII grossly intact, extremities’ motor and sensory functions grossly

## 2022-04-15 NOTE — ED PROVIDER NOTE - NS ED ROS FT
all negative except as noted in  y.o. presenting for of duration. Started and is constant intermittent radiating non radiating. Alleviating or aggrivating factors. Took the following medications. Denies fever, chills, dizziness, lihgtheadedness, visual changes, head trauma, falls LOC, blood thinners/asa usage. gait abnormalities, paresthesias, back pain, neck pain, CP, palpitaitons, sob, cough, pleurisy, abdominal pain , N/V/D constipation, anorexia, duysuria, hemautria, urgency, freuqency, abnormal vaginal bleeding, abnormal vaginal discharge. Hx of STI. Hx of abominal surgeries. No recent hospitalizations or abx usage.

## 2022-04-15 NOTE — ED PROVIDER NOTE - NS ED ATTENDING STATEMENT MOD
This was a shared visit with the DAYANA. I reviewed and verified the documentation and independently performed the documented:

## 2022-04-22 ENCOUNTER — OUTPATIENT (OUTPATIENT)
Dept: OUTPATIENT SERVICES | Facility: HOSPITAL | Age: 42
LOS: 1 days | Discharge: HOME | End: 2022-04-22
Payer: COMMERCIAL

## 2022-04-22 DIAGNOSIS — N97.8 FEMALE INFERTILITY OF OTHER ORIGIN: ICD-10-CM

## 2022-04-22 PROCEDURE — 74740 X-RAY FEMALE GENITAL TRACT: CPT | Mod: 26

## 2022-04-22 PROCEDURE — 58340 CATHETER FOR HYSTEROGRAPHY: CPT

## 2023-01-09 NOTE — ED ADULT TRIAGE NOTE - WEIGHT METHOD
Last Refill: 10/10/2022 @90 with 0 refills  Last Px: 3/16/2022    Future appt: Your appointments     Date & Time Appointment Department [facility])    Mar 16, 2023  9:00 AM CDT Physical - Established with Kaveh Estrada MD 25 )            [address] [facility] [address]  [id number]  [phone]        Last Appointment with provider:   12/28/2022  Last appointment at [facility]:  12/28/2022  Cholesterol, Total (mg/dL)   Date Value   02/28/2022 157   08/16/2017 212     Total Cholesterol (mg/dL)   Date Value   08/15/2018 225 (H)     HDL Cholesterol (mg/dL)   Date Value   02/28/2022 56   08/15/2018 57     LDL Cholesterol Calc (mg/dL)   Date Value   08/15/2018 125 (H)     LDL Cholesterol (mg/dL)   Date Value   02/28/2022 74     Triglycerides (mg/dL)   Date Value   02/28/2022 160 (H)   08/15/2018 213 (H)     Lab Results   Component Value Date    A1C 5.3 02/27/2020     Lab Results   Component Value Date    T4F 0.8 02/28/2022    TSH 0.694 02/28/2022    TSH 0.613 02/28/2022       No follow-ups on file. stated

## 2023-04-18 NOTE — ED ADULT TRIAGE NOTE - TEMPERATURE IN FAHRENHEIT (DEGREES F)
97.3 Double O-Z Flap Text: The defect edges were debeveled with a #15 scalpel blade.  Given the location of the defect, shape of the defect and the proximity to free margins a Double O-Z flap was deemed most appropriate.  Using a sterile surgical marker, an appropriate transposition flap was drawn incorporating the defect and placing the expected incisions within the relaxed skin tension lines where possible. The area thus outlined was incised deep to adipose tissue with a #15 scalpel blade.  The skin margins were undermined to an appropriate distance in all directions utilizing iris scissors.

## 2025-05-13 ENCOUNTER — EMERGENCY (EMERGENCY)
Facility: HOSPITAL | Age: 45
LOS: 0 days | Discharge: HOME | End: 2025-05-13
Attending: EMERGENCY MEDICINE
Payer: COMMERCIAL

## 2025-05-13 ENCOUNTER — TRANSCRIPTION ENCOUNTER (OUTPATIENT)
Age: 45
End: 2025-05-13

## 2025-05-13 VITALS
TEMPERATURE: 98 F | DIASTOLIC BLOOD PRESSURE: 82 MMHG | HEIGHT: 64 IN | HEART RATE: 99 BPM | OXYGEN SATURATION: 99 % | SYSTOLIC BLOOD PRESSURE: 126 MMHG | RESPIRATION RATE: 18 BRPM

## 2025-05-13 DIAGNOSIS — L02.411 CUTANEOUS ABSCESS OF RIGHT AXILLA: ICD-10-CM

## 2025-05-13 DIAGNOSIS — L02.412 CUTANEOUS ABSCESS OF LEFT AXILLA: ICD-10-CM

## 2025-05-13 DIAGNOSIS — L73.2 HIDRADENITIS SUPPURATIVA: ICD-10-CM

## 2025-05-13 DIAGNOSIS — Z98.891 HISTORY OF UTERINE SCAR FROM PREVIOUS SURGERY: ICD-10-CM

## 2025-05-13 DIAGNOSIS — Z88.8 ALLERGY STATUS TO OTHER DRUGS, MEDICAMENTS AND BIOLOGICAL SUBSTANCES: ICD-10-CM

## 2025-05-13 PROCEDURE — 99284 EMERGENCY DEPT VISIT MOD MDM: CPT

## 2025-05-13 PROCEDURE — 99283 EMERGENCY DEPT VISIT LOW MDM: CPT

## 2025-05-13 RX ORDER — CEPHALEXIN 250 MG/1
1 CAPSULE ORAL
Qty: 28 | Refills: 0
Start: 2025-05-13 | End: 2025-05-19

## 2025-05-13 NOTE — ED PROVIDER NOTE - CARE PROVIDER_API CALL
Roberto Singh  Plastic Surgery  [address] [place] [id number]  Phone: [phone]  Fax: [phone]  Follow Up Time:

## 2025-05-13 NOTE — ED PROVIDER NOTE - NSFOLLOWUPINSTRUCTIONS_ED_ALL_ED_FT
Please follow up burn clinic on 3/15/25 at 10:30am.     What is hidradenitis suppurativa (HS)?  Hidradenitis suppurativa is a condition where chronic (long term)  inflammation (painful swelling and irritation) develops in the skin. It typically  begins after puberty and usually starts as pimple-like bumps. HS affects about  1-4% of the general population, women more than men. An older name for this  condition is acne inversa.  The cause of HS is not well understood, how your HS develops and advances  could be from many factors:  • Genetic (inherited from your parents)  • Hormonal  • Bacterial  • Immune system  • Environmental  HS is not contagious. It is not related to personal hygiene or deodorant use.  What are the symptoms?  HS can look like pimples, blackheads, infected hair follicles, or boils.  It typically causes painful, deep, swollen lesions (irritated tissue under the skin)  that can result in abscesses, which are similar to pimples. These areas can  enlarge and break open, causing drainage of blood or pus. In advanced stages  this can lead to scarring, disfigurement, and disability.  The lesions tend to occur in areas of the skin containing sweat glands. The  areas of the body that are most commonly affected are:  • The armpits  • Under the breasts  • The groin (where the thigh meets the abdomen)  • The vulva (external female genital organs, including the clitoris, vaginal lips,  and the opening to the vagina)  • The buttocks  Department of Obstetrics and Gynecology  - 2 -  How is it treated?  Identifying and treating HS early can prevent it from worsening. The goal of  treatment is to slow the disease progression and to decrease the symptoms.  Currently there is no cure for hidradenitis suppurativa.  There are 3 stages of the disease, which help guide treatment:  1. Stage 1: Limited to a small single lesion or multiple lesions without scarring.  • Treatment: antibiotic ointment, oral antibiotics, or oral contraceptives  (birth control) with hormones that block acne.  2. Stage 2: More than one widely-, frequent lesions with scarring.  • Treatment: antibiotic ointment, oral antibiotics, oral contraceptives with  hormones that block acne, or steroid shot into the lesion.  • Surgery or targeted therapy (specific drugs used to treat the factors  causing your condition) are sometimes recommended.  3. Stage 3: Widespread disease with many interconnected lesions.  • Treatment: The same medical treatments as seen in Stage 1 and Stage 2  with the addition of surgery. Your surgeon will decide which surgery fits  you based on what stage you are in.  What is my risk of developing stage 3?  On average  • 55-70 out of 100 (55-70%) patients stay in stage 1  • 28 out of 100 (28%) progress to stage 2  • 4-7 out of 100 (4-7%) patients progress to stage 3  Preventive treatments, regardless of the stage:  • Zinc pincolinate 30 mg with copper 2 mg twice day  • Vitamin C 500 mg three times day  • Vitamin D3 3786-8065 IU are used daily.  • If appropriate oral contraceptives (Asmita or Sally) with the hormone  drospirenone have been shown to help.  Department of Obstetrics and Gynecology  Hidradenitis Suppurativa (HS)  - 3 -  Disclaimer: This document contains information and/or instructional materials developed by  [facility] for the typical patient with your condition. It may include links to online  content that was not created by [facility] and for which [facility] does not  assume responsibility. It does not replace medical advice from your health care provider  because your experience may differ from that of the typical patient. Talk to your health care  provider if you have any questions about this document, your condition or your treatment plan.    Patient Education by [facility] is licensed under a Creative Commons   How can I prevent HS?  • Decrease injury, friction, heat, and sweating to the area  • Wear loose fitting clothing, cotton underwear or loose boxer style underwear  • Avoid any pads  • Lose weight (ask your doctor)  • Start a zero dairy with low glycemic load diet (ask your doctor)  • Stop smoking and stop all nicotine products  • If possible, get laser hair removal  • Use antibacterial soap  Additional Resource:  www.[facility].org

## 2025-05-13 NOTE — ED PROVIDER NOTE - PHYSICAL EXAMINATION
CONSTITUTIONAL: well-appearing, in NAD  SKIN: Warm dry, normal skin turgor  HEAD: NCAT  EYES: EOMI, PERRLA, no scleral icterus, conjunctiva pink  ENT: normal pharynx with no erythema or exudates  NECK: Supple; non tender. Full ROM.  CARD: RRR  RESP: clear to ausculation b/l. No crackles or wheezing.  BREAST: b/l draining abscesses in axilla w/ some erythema, No erythema, abscess or skin changes on breasts.   ABD: soft, non-tender, non-distended, no rebound or guarding.  EXT: Full ROM,   NEURO: normal motor. normal sensory. Normal gait.  PSYCH: Cooperative, appropriate.

## 2025-05-13 NOTE — ED PROVIDER NOTE - OBJECTIVE STATEMENT
Patient is a 45 yo F w/ past medical history of hidradenitis suppurativa presenting to ED for L breast and axilla pain. Pt states she stopped taking Patient is a 43 yo F w/ past medical history of hidradenitis suppurativa presenting to ED for L breast and axilla pain. Pt states she stopped takingMedicine for HS after giving birth to her baby 10 months ago.  Patient states she has delayed wound healing and has had issues with her  scar as well.  Patient states she ultimately needs surgery for her HS but has not been able to find a time to do so.  Patient has been using warm compresses.  Patient states there is a pain in her left axilla that radiates into her left breast.  Patient is still able to breast-feed.  No nausea, vomiting, fevers, chills, chest pain, shortness of breath, abdominal pain

## 2025-05-13 NOTE — ED PROVIDER NOTE - CLINICAL SUMMARY MEDICAL DECISION MAKING FREE TEXT BOX
Patient with history of hidradenitis suppurative presenting with left breast and axilla pain.  Patient has a draining abscess.  Seen by surgery and case jude with burn.  Will discharge antibiotics patient follow-up on Thursday in the burn clinic.

## 2025-05-13 NOTE — ED ADULT NURSE NOTE - AVIAN FLU SYMPTOMS
Gerri [facility] [place] calling - they need clarification on a medication / please call    No

## 2025-05-13 NOTE — ED PROVIDER NOTE - PROGRESS NOTE DETAILS
sukumar: Spoke with burn and surgery. Burn states Dr. Singh is not in house currently. Choice of admitting the patient vs seeing burn outpatient was given by burn. Spoke with general/breast surgery Dr. Tobin who did evaluate the patient bedside but states burn needs to evaluate given HS flare is in axilla. Spoke with burn again but was told surgery takes care of this condition.     Surgery asked to contact burn and discuss plan for patient. frankz: Spoke with outpatient burn, made appointment for patient for Thursday at 10:30 AM.  Patient made aware.  Return precautions given.  No other concerns at this time.

## 2025-05-13 NOTE — ED PROVIDER NOTE - NSFOLLOWUPCLINICS_GEN_ALL_ED_FT
[facility]  Burn  [address], [address]  [place] [address]  Phone: [phone]  Fax:   Scheduled Appointment: 5/15/2025 10:30 AM

## 2025-05-13 NOTE — ED PROVIDER NOTE - PATIENT PORTAL LINK FT
You can access the FollowMyHealth Patient Portal offered by [facility] by registering at the following website: [email or web address]. By joining [email or web address]’s FollowMyHealth portal, you will also be able to view your health information using other applications (apps) compatible with our system.

## 2025-05-13 NOTE — ED ADULT NURSE NOTE - NSFALLUNIVINTERV_ED_ALL_ED
Bed/Stretcher in lowest position, wheels locked, appropriate side rails in place/Call bell, personal items and telephone in reach/Instruct patient to call for assistance before getting out of bed/chair/stretcher/Non-slip footwear applied when patient is off stretcher/[place] to call system/Physically safe environment - no spills, clutter or unnecessary equipment/Purposeful proactive rounding/Room/bathroom lighting operational, light cord in reach

## 2025-05-15 ENCOUNTER — OUTPATIENT (OUTPATIENT)
Dept: OUTPATIENT SERVICES | Facility: HOSPITAL | Age: 45
LOS: 1 days | End: 2025-05-15
Payer: COMMERCIAL

## 2025-05-15 ENCOUNTER — APPOINTMENT (OUTPATIENT)
Dept: BURN CARE | Facility: CLINIC | Age: 45
End: 2025-05-15

## 2025-05-15 ENCOUNTER — NON-APPOINTMENT (OUTPATIENT)
Age: 45
End: 2025-05-15

## 2025-05-15 VITALS
HEART RATE: 75 BPM | SYSTOLIC BLOOD PRESSURE: 127 MMHG | DIASTOLIC BLOOD PRESSURE: 78 MMHG | WEIGHT: 175 LBS | HEIGHT: 64 IN | BODY MASS INDEX: 29.88 KG/M2 | OXYGEN SATURATION: 95 %

## 2025-05-15 DIAGNOSIS — Z00.8 ENCOUNTER FOR OTHER GENERAL EXAMINATION: ICD-10-CM

## 2025-05-15 PROCEDURE — 99203 OFFICE O/P NEW LOW 30 MIN: CPT

## 2025-05-21 DIAGNOSIS — Z98.890 OTHER SPECIFIED POSTPROCEDURAL STATES: ICD-10-CM

## 2025-07-17 ENCOUNTER — APPOINTMENT (OUTPATIENT)
Dept: BURN CARE | Facility: CLINIC | Age: 45
End: 2025-07-17